# Patient Record
Sex: FEMALE | Race: WHITE
[De-identification: names, ages, dates, MRNs, and addresses within clinical notes are randomized per-mention and may not be internally consistent; named-entity substitution may affect disease eponyms.]

---

## 2019-08-06 ENCOUNTER — HOSPITAL ENCOUNTER (OUTPATIENT)
Dept: HOSPITAL 91 - GIL | Age: 59
Discharge: HOME | End: 2019-08-06
Payer: COMMERCIAL

## 2019-08-06 ENCOUNTER — HOSPITAL ENCOUNTER (OUTPATIENT)
Dept: HOSPITAL 10 - GIL | Age: 59
Discharge: HOME | End: 2019-08-06
Attending: INTERNAL MEDICINE
Payer: COMMERCIAL

## 2019-08-06 VITALS — SYSTOLIC BLOOD PRESSURE: 146 MMHG | DIASTOLIC BLOOD PRESSURE: 75 MMHG | HEART RATE: 60 BPM | RESPIRATION RATE: 16 BRPM

## 2019-08-06 VITALS — DIASTOLIC BLOOD PRESSURE: 71 MMHG | SYSTOLIC BLOOD PRESSURE: 150 MMHG | RESPIRATION RATE: 15 BRPM

## 2019-08-06 VITALS — HEIGHT: 67 IN | BODY MASS INDEX: 39.31 KG/M2 | WEIGHT: 250.45 LBS

## 2019-08-06 DIAGNOSIS — D12.4: ICD-10-CM

## 2019-08-06 DIAGNOSIS — F17.200: ICD-10-CM

## 2019-08-06 DIAGNOSIS — J44.9: ICD-10-CM

## 2019-08-06 DIAGNOSIS — D12.2: ICD-10-CM

## 2019-08-06 DIAGNOSIS — D12.7: ICD-10-CM

## 2019-08-06 DIAGNOSIS — D12.0: ICD-10-CM

## 2019-08-06 DIAGNOSIS — E66.01: ICD-10-CM

## 2019-08-06 DIAGNOSIS — E78.5: ICD-10-CM

## 2019-08-06 DIAGNOSIS — D12.5: ICD-10-CM

## 2019-08-06 DIAGNOSIS — Z12.11: Primary | ICD-10-CM

## 2019-08-06 DIAGNOSIS — I10: ICD-10-CM

## 2019-08-06 PROCEDURE — 45380 COLONOSCOPY AND BIOPSY: CPT

## 2019-08-06 PROCEDURE — 88305 TISSUE EXAM BY PATHOLOGIST: CPT

## 2019-08-06 PROCEDURE — 45385 COLONOSCOPY W/LESION REMOVAL: CPT

## 2019-08-06 NOTE — PAC
Date/Time of Note


Date/Time of Note


DATE: 8/6/19 


TIME: 17:51





Post-Anesthesia Notes


Post-Anesthesia Note


Last documented vital signs





Vital Signs


  Date      Temp  Pulse  Resp  B/P (MAP)   Pulse Ox  O2          O2 Flow    FiO2


Time                                                 Delivery    Rate


    8/6/19  97.1     60    16      146/75         9  Room Air


     15:03                           (98)





Activity:  WNL


Respiratory function:  WNL


Cardiovascular function:  WNL


Mental status:  Baseline


Pain reasonably controlled:  Yes


Hydration appropriate:  Yes


Nausea/Vomiting absent:  Yes


Comments


BP:112/67, P;78, spo2:100%, T;98,8











ANANTH SAN MD               Aug 6, 2019 17:51

## 2021-01-19 NOTE — PREAC
Date/Time of Note


Date/Time of Note


DATE: 8/6/19 


TIME: 16:45





Anesthesia Eval and Record


Evaluation


Time Pre-Procedure Interview


DATE: 8/6/19 


TIME: 16:45


Age


58


Sex


female


NPO:  8 hrs


Preoperative diagnosis


Colon screening


Planned procedure


Colonoscopy





Past Medical History


Past Medical History:  Includes


Cardio:  HTN, Dyslipidemia


Pulm:  Smoking Hx, COPD, Asthma


GI:  Morbid obesity





Surgery & Anesthesia Issues


No known issue





Meds


Anticoagulation:  No


Beta Blocker within 24 hr:  No


Reason Beta Blocker not given:  Pt. not on B-Blocker


Reported Medications


[Tumeric]   No Conflict Check


   8/6/19


[Vitamin D3]   No Conflict Check


   8/6/19


[Vitamin B12]   No Conflict Check


   8/6/19


[Lisinopril]   No Conflict Check


   8/6/19


[Folic Acid]   No Conflict Check


   8/6/19


[Citalopram]   No Conflict Check


   8/6/19


Albuterol Sulfate* (Albuterol Sulfate* HFA) 8.5 Gm Hfa.aer.ad, 2 PUFF IH Q6, EA


   3/4/15


Citalopram Hydrobromide* (Celexa*) 10 Mg Tablet, 10 MG PO DAILY, TAB


   3/4/15


Discontinued Reported Medications


[Celexa]   No Conflict Check


   8/6/19


[Vitamins]   No Conflict Check, PO


   3/4/15


Meds reviewed:  Yes





Allergies


Coded Allergies:  


     No Known Allergy (Unverified , 8/6/19)


Allergies Reviewed:  Yes





Labs/Studies


Labs Reviewed:  Reviewed by anesthesiologist


Pregnancy test:  N/A


Studies:  ECG





Pre-procedure Exam


Last vitals





Vital Signs


  Date      Temp  Pulse  Resp  B/P (MAP)   Pulse Ox  O2          O2 Flow    FiO2


Time                                                 Delivery    Rate


    8/6/19  97.1     60    16      146/75         9  Room Air


     15:03                           (98)





Airway:  Adequate mouth opening, Adequate thyromental dist


Mallampati:  Mallampati III


Teeth:  Normal


Lung:  Normal


Heart:  Normal





ASA Physical Status


ASA physical status:  3


Emergency:  None





Planned Anesthetic


General/MAC:  MAC





Pre-operative Attestations


Prior to commencing anesthesia and surgery, the patient was re-evaluated, there 


was verification of:


*The patient's identity


*The results of appropriate recent lab work and preoperative vital signs


*The above evaluation not changing prior to induction


*Anesthetic plan, risk benefits, alternative and complications discussed with 


patient/family; questions answered; patient/family understands, accepts and 


wishes to proceed.











ANANTH SAN MD               Aug 6, 2019 16:47
details…

## 2024-11-27 ENCOUNTER — HOSPITAL ENCOUNTER (EMERGENCY)
Dept: HOSPITAL 54 - ER | Age: 64
Discharge: HOME | End: 2024-11-27
Payer: COMMERCIAL

## 2024-11-27 VITALS — WEIGHT: 215 LBS | HEIGHT: 67 IN | BODY MASS INDEX: 33.74 KG/M2

## 2024-11-27 VITALS — DIASTOLIC BLOOD PRESSURE: 84 MMHG | SYSTOLIC BLOOD PRESSURE: 129 MMHG | TEMPERATURE: 98.2 F

## 2024-11-27 VITALS — OXYGEN SATURATION: 96 %

## 2024-11-27 VITALS — OXYGEN SATURATION: 97 %

## 2024-11-27 DIAGNOSIS — J45.901: Primary | ICD-10-CM

## 2024-11-27 DIAGNOSIS — J06.9: ICD-10-CM

## 2024-11-27 DIAGNOSIS — Z79.52: ICD-10-CM

## 2024-11-27 DIAGNOSIS — Z20.822: ICD-10-CM

## 2024-11-27 DIAGNOSIS — J18.9: ICD-10-CM

## 2024-11-27 LAB
ALBUMIN SERPL BCP-MCNC: 2.8 G/DL (ref 3.4–5)
ALP SERPL-CCNC: 111 U/L (ref 46–116)
ALT SERPL W P-5'-P-CCNC: 28 U/L (ref 12–78)
AST SERPL W P-5'-P-CCNC: 19 U/L (ref 15–37)
BASOPHILS # BLD AUTO: 0.1 K/UL (ref 0–0.2)
BASOPHILS NFR BLD AUTO: 0.7 % (ref 0–2)
BILIRUB DIRECT SERPL-MCNC: 0.3 MG/DL (ref 0–0.2)
BILIRUB SERPL-MCNC: 1.1 MG/DL (ref 0.2–1)
BUN SERPL-MCNC: 12 MG/DL (ref 7–18)
CALCIUM SERPL-MCNC: 8.6 MG/DL (ref 8.5–10.1)
CHLORIDE SERPL-SCNC: 105 MMOL/L (ref 98–107)
CO2 SERPL-SCNC: 26 MMOL/L (ref 21–32)
CREAT SERPL-MCNC: 1.1 MG/DL (ref 0.6–1.3)
EOSINOPHIL # BLD AUTO: 0.4 K/UL (ref 0–0.7)
EOSINOPHIL NFR BLD AUTO: 2.5 % (ref 0–6)
ERYTHROCYTE [DISTWIDTH] IN BLOOD BY AUTOMATED COUNT: 13.5 % (ref 11.5–15)
GLUCOSE SERPL-MCNC: 112 MG/DL (ref 74–106)
HCT VFR BLD AUTO: 39 % (ref 33–45)
HGB BLD-MCNC: 12.7 G/DL (ref 11.5–14.8)
LYMPHOCYTES NFR BLD AUTO: 18.3 % (ref 20–44)
LYMPHOCYTES NFR BLD AUTO: 2.6 K/UL (ref 0.8–4.8)
MCH RBC QN AUTO: 29 PG (ref 26–33)
MCHC RBC AUTO-ENTMCNC: 33 G/DL (ref 31–36)
MCV RBC AUTO: 88 FL (ref 82–100)
MONOCYTES NFR BLD AUTO: 1.1 K/UL (ref 0.1–1.3)
MONOCYTES NFR BLD AUTO: 7.4 % (ref 2–12)
NEUTROPHILS # BLD AUTO: 10.2 K/UL (ref 1.8–8.9)
NEUTROPHILS NFR BLD AUTO: 71.1 % (ref 43–81)
NT-PROBNP SERPL-MCNC: 1005 PG/ML (ref 0–125)
PLATELET # BLD AUTO: 259 K/UL (ref 150–450)
POTASSIUM SERPL-SCNC: 4.1 MMOL/L (ref 3.5–5.1)
PROT SERPL-MCNC: 7 G/DL (ref 6.4–8.2)
RBC # BLD AUTO: 4.38 MIL/UL (ref 4–5.2)
SODIUM SERPL-SCNC: 139 MMOL/L (ref 136–145)
WBC NRBC COR # BLD AUTO: 14.3 K/UL (ref 4.3–11)

## 2024-11-27 PROCEDURE — 87804 INFLUENZA ASSAY W/OPTIC: CPT

## 2024-11-27 PROCEDURE — 94640 AIRWAY INHALATION TREATMENT: CPT

## 2024-11-27 PROCEDURE — 80076 HEPATIC FUNCTION PANEL: CPT

## 2024-11-27 PROCEDURE — 80048 BASIC METABOLIC PNL TOTAL CA: CPT

## 2024-11-27 PROCEDURE — 87426 SARSCOV CORONAVIRUS AG IA: CPT

## 2024-11-27 PROCEDURE — 84484 ASSAY OF TROPONIN QUANT: CPT

## 2024-11-27 PROCEDURE — 87420 RESP SYNCYTIAL VIRUS AG IA: CPT

## 2024-11-27 PROCEDURE — 83880 ASSAY OF NATRIURETIC PEPTIDE: CPT

## 2024-11-27 PROCEDURE — 85025 COMPLETE CBC W/AUTO DIFF WBC: CPT

## 2024-11-27 PROCEDURE — 36415 COLL VENOUS BLD VENIPUNCTURE: CPT

## 2024-11-27 PROCEDURE — 99284 EMERGENCY DEPT VISIT MOD MDM: CPT

## 2024-11-27 PROCEDURE — 71045 X-RAY EXAM CHEST 1 VIEW: CPT

## 2024-11-27 RX ADMIN — ALBUTEROL SULFATE ONE MG: 2.5 SOLUTION RESPIRATORY (INHALATION) at 10:35

## 2024-11-27 RX ADMIN — Medication ONE MG: at 10:35
